# Patient Record
Sex: FEMALE | Race: BLACK OR AFRICAN AMERICAN | Employment: UNEMPLOYED | URBAN - METROPOLITAN AREA
[De-identification: names, ages, dates, MRNs, and addresses within clinical notes are randomized per-mention and may not be internally consistent; named-entity substitution may affect disease eponyms.]

---

## 2019-09-04 ENCOUNTER — HOSPITAL ENCOUNTER (EMERGENCY)
Age: 24
Discharge: HOME | End: 2019-09-04
Attending: EMERGENCY MEDICINE
Payer: COMMERCIAL

## 2019-09-04 VITALS
HEART RATE: 97 BPM | OXYGEN SATURATION: 100 % | TEMPERATURE: 98.6 F | HEIGHT: 59 IN | SYSTOLIC BLOOD PRESSURE: 126 MMHG | WEIGHT: 149.47 LBS | RESPIRATION RATE: 16 BRPM | BODY MASS INDEX: 30.13 KG/M2 | DIASTOLIC BLOOD PRESSURE: 97 MMHG

## 2019-09-04 DIAGNOSIS — M25.561 ACUTE PAIN OF RIGHT KNEE: Primary | ICD-10-CM

## 2019-09-04 DIAGNOSIS — N76.0 BACTERIAL VAGINOSIS: ICD-10-CM

## 2019-09-04 DIAGNOSIS — B96.89 BACTERIAL VAGINOSIS: ICD-10-CM

## 2019-09-04 LAB
BACTERIA WET PREP: ABNORMAL
CLUE CELLS: ABNORMAL
EPITHELIAL CELLS WET PREP: ABNORMAL
HCG(URINE) PREGNANCY TEST: NEGATIVE
RBC WET PREP: ABNORMAL
SOURCE WET PREP: ABNORMAL
TRICHOMONAS PREP: ABNORMAL
URINE TRICHOMONAS EVALUATION: NORMAL
WBC WET PREP: ABNORMAL
YEAST WET PREP: ABNORMAL

## 2019-09-04 PROCEDURE — 87591 N.GONORRHOEAE DNA AMP PROB: CPT

## 2019-09-04 PROCEDURE — 99283 EMERGENCY DEPT VISIT LOW MDM: CPT

## 2019-09-04 PROCEDURE — 81015 MICROSCOPIC EXAM OF URINE: CPT

## 2019-09-04 PROCEDURE — 87210 SMEAR WET MOUNT SALINE/INK: CPT

## 2019-09-04 PROCEDURE — 87491 CHLMYD TRACH DNA AMP PROBE: CPT

## 2019-09-04 PROCEDURE — 84703 CHORIONIC GONADOTROPIN ASSAY: CPT

## 2019-09-04 RX ORDER — METRONIDAZOLE 500 MG/1
500 TABLET ORAL 2 TIMES DAILY
Qty: 14 TABLET | Refills: 0 | Status: SHIPPED | OUTPATIENT
Start: 2019-09-04 | End: 2019-09-04

## 2019-09-04 RX ORDER — METRONIDAZOLE 7.5 MG/G
GEL VAGINAL
Qty: 1 TUBE | Refills: 0 | Status: SHIPPED | OUTPATIENT
Start: 2019-09-04 | End: 2019-09-11

## 2019-09-05 LAB
C TRACH DNA GENITAL QL NAA+PROBE: NEGATIVE
N. GONORRHOEAE DNA: NEGATIVE

## 2019-10-03 ENCOUNTER — HOSPITAL ENCOUNTER (EMERGENCY)
Age: 24
Discharge: HOME | End: 2019-10-03
Attending: EMERGENCY MEDICINE
Payer: COMMERCIAL

## 2019-10-03 VITALS
SYSTOLIC BLOOD PRESSURE: 130 MMHG | HEART RATE: 96 BPM | RESPIRATION RATE: 14 BRPM | HEIGHT: 60 IN | DIASTOLIC BLOOD PRESSURE: 87 MMHG | TEMPERATURE: 98.6 F | WEIGHT: 147.05 LBS | BODY MASS INDEX: 28.87 KG/M2 | OXYGEN SATURATION: 100 %

## 2019-10-03 DIAGNOSIS — G62.9 NEUROPATHY: Primary | ICD-10-CM

## 2019-10-03 DIAGNOSIS — R20.0 NUMBNESS: ICD-10-CM

## 2019-10-03 PROCEDURE — 99283 EMERGENCY DEPT VISIT LOW MDM: CPT

## 2019-10-03 RX ORDER — METHYLPREDNISOLONE 4 MG/1
2 TABLET ORAL DAILY
Qty: 3 TABLET | Refills: 0 | Status: SHIPPED | OUTPATIENT
Start: 2019-10-03 | End: 2019-10-09

## 2020-03-06 ENCOUNTER — HOSPITAL ENCOUNTER (EMERGENCY)
Age: 25
Discharge: HOME | End: 2020-03-06
Attending: EMERGENCY MEDICINE
Payer: COMMERCIAL

## 2020-03-06 VITALS
SYSTOLIC BLOOD PRESSURE: 136 MMHG | BODY MASS INDEX: 26.52 KG/M2 | HEART RATE: 90 BPM | WEIGHT: 149.69 LBS | RESPIRATION RATE: 16 BRPM | HEIGHT: 63 IN | DIASTOLIC BLOOD PRESSURE: 78 MMHG | OXYGEN SATURATION: 100 % | TEMPERATURE: 98.7 F

## 2020-03-06 PROCEDURE — 6370000000 HC RX 637 (ALT 250 FOR IP): Performed by: EMERGENCY MEDICINE

## 2020-03-06 PROCEDURE — 96372 THER/PROPH/DIAG INJ SC/IM: CPT

## 2020-03-06 PROCEDURE — 87070 CULTURE OTHR SPECIMN AEROBIC: CPT

## 2020-03-06 PROCEDURE — 99283 EMERGENCY DEPT VISIT LOW MDM: CPT

## 2020-03-06 PROCEDURE — 56420 I&D BARTHOLINS GLAND ABSCESS: CPT

## 2020-03-06 PROCEDURE — 87205 SMEAR GRAM STAIN: CPT

## 2020-03-06 PROCEDURE — 87591 N.GONORRHOEAE DNA AMP PROB: CPT

## 2020-03-06 PROCEDURE — 6360000002 HC RX W HCPCS: Performed by: EMERGENCY MEDICINE

## 2020-03-06 PROCEDURE — 87186 SC STD MICRODIL/AGAR DIL: CPT

## 2020-03-06 PROCEDURE — 87077 CULTURE AEROBIC IDENTIFY: CPT

## 2020-03-06 PROCEDURE — 87491 CHLMYD TRACH DNA AMP PROBE: CPT

## 2020-03-06 RX ORDER — CEFTRIAXONE SODIUM 250 MG/1
250 INJECTION, POWDER, FOR SOLUTION INTRAMUSCULAR; INTRAVENOUS ONCE
Status: COMPLETED | OUTPATIENT
Start: 2020-03-06 | End: 2020-03-06

## 2020-03-06 RX ORDER — SULFAMETHOXAZOLE AND TRIMETHOPRIM 800; 160 MG/1; MG/1
1 TABLET ORAL 2 TIMES DAILY
Qty: 20 TABLET | Refills: 0 | Status: SHIPPED | OUTPATIENT
Start: 2020-03-06 | End: 2020-03-16

## 2020-03-06 RX ORDER — AZITHROMYCIN 500 MG/1
1000 TABLET, FILM COATED ORAL ONCE
Status: COMPLETED | OUTPATIENT
Start: 2020-03-06 | End: 2020-03-06

## 2020-03-06 RX ORDER — CEPHALEXIN 500 MG/1
500 CAPSULE ORAL 4 TIMES DAILY
Qty: 28 CAPSULE | Refills: 0 | Status: SHIPPED | OUTPATIENT
Start: 2020-03-06 | End: 2020-03-13

## 2020-03-06 RX ADMIN — AZITHROMYCIN 1000 MG: 500 TABLET, FILM COATED ORAL at 22:13

## 2020-03-06 RX ADMIN — CEFTRIAXONE SODIUM 250 MG: 250 INJECTION, POWDER, FOR SOLUTION INTRAMUSCULAR; INTRAVENOUS at 22:13

## 2020-03-06 ASSESSMENT — PAIN SCALES - GENERAL
PAINLEVEL_OUTOF10: 0
PAINLEVEL_OUTOF10: 4

## 2020-03-06 ASSESSMENT — PAIN DESCRIPTION - LOCATION: LOCATION: VAGINA

## 2020-03-06 ASSESSMENT — PAIN DESCRIPTION - DESCRIPTORS: DESCRIPTORS: CONSTANT;PRESSURE

## 2020-03-06 ASSESSMENT — PAIN DESCRIPTION - PAIN TYPE: TYPE: ACUTE PAIN

## 2020-03-07 NOTE — ED PROVIDER NOTES
file     Physically abused: Not on file     Forced sexual activity: Not on file   Other Topics Concern    Not on file   Social History Narrative    Not on file     No current facility-administered medications for this encounter. Current Outpatient Medications   Medication Sig Dispense Refill    cephALEXin (KEFLEX) 500 MG capsule Take 1 capsule by mouth 4 times daily for 7 days 28 capsule 0    sulfamethoxazole-trimethoprim (BACTRIM DS) 800-160 MG per tablet Take 1 tablet by mouth 2 times daily for 10 days 20 tablet 0    insulin NPH (HUMULIN N;NOVOLIN N) 100 UNIT/ML injection vial Inject into the skin 2 times daily (before meals)       Allergies   Allergen Reactions    Shiitake Mushroom Anaphylaxis       REVIEW OF SYSTEMS  Positives and pertinent negatives as per HPI. Six other systems were reviewed and are negative. Nursing notes pertaining to ROS were reviewed. PHYSICAL EXAM   /78   Pulse 90   Temp 98.7 °F (37.1 °C) (Oral)   Resp 16   Ht 5' 3\" (1.6 m)   Wt 149 lb 11.1 oz (67.9 kg)   LMP  (LMP Unknown)   SpO2 100%   BMI 26.52 kg/m²   GENERAL APPEARANCE: Awake and alert. Cooperative. No acute distress. HEAD: Normocephalic. Atraumatic. EYES: PERRL. EOM's grossly intact. No scleral icterus, injection or exudate  ENT: Mucous membranes are moist.  NECK: Supple. Normal ROM. ABDOMEN: Nondistended, nontender  Pelvic: Right Bartholin's gland abscess is approximately 4 cm in size and tender. No other vaginal discharge or external genitalia lesions are noted. EXTREMITIES: MAEE. No acute deformities. SKIN: Warm and dry. NEUROLOGICAL: Alert and oriented.      Incision/Drainage  Date/Time: 3/6/2020 10:10 PM  Performed by: Lina King MD  Authorized by: Lina King MD     Consent:     Consent obtained:  Verbal    Consent given by:  Patient    Risks discussed:  Incomplete drainage and bleeding    Alternatives discussed:  Referral  Location:     Type:  Bartholin cyst Size:  4cm    Location:  Anogenital    Anogenital location:  Vulva  Pre-procedure details:     Skin preparation:  Betadine  Anesthesia (see MAR for exact dosages): Anesthesia method:  None  Procedure type:     Complexity:  Complex  Procedure details:     Needle aspiration: no      Incision types:  Stab incision    Incision depth:  Submucosal    Scalpel blade:  11    Wound management:  Probed and deloculated    Drainage:  Purulent    Drainage amount:  Copious    Wound treatment:  Drain placed    Packing materials: Word catheter  Post-procedure details:     Patient tolerance of procedure: Tolerated well, no immediate complications          ED COURSE/MDM  Bartholin's abscess: Status post incision and drainage with placement of Word catheter. Cultures were taken from the abscess and vaginal cultures were also taken. Patient was treated empirically for gonorrhea and chlamydia and will be given a 7-day course of Keflex and Bactrim to treat for E. coli and staph. Patient was advised on the care of the Word catheter and to have follow-up with her OB/GYN or PCP within 3 to 4 weeks to have the catheter removed. Hypertension:  Patient was hypertensive during the ER visit today. There are no signs of hypertensive emergency or evidence of end organ damage by history or physical exam.  These findings were discussed with the patient and advised to follow up with primary care physician to further assess and treat hypertension in the outpatient setting. The patient's blood pressure was found to be elevated according to CMS/Medicare and the Affordable Care Act/ObamaCare criteria. Elevated blood pressure could occur because of pain or anxiety or other reasons and does not mean that they need to have their blood pressure treated or medications otherwise adjusted. However, this could also be a sign that they will need to have their blood pressure treated or medications changed.   The patient was instructed to take a

## 2020-03-09 LAB
C TRACH DNA GENITAL QL NAA+PROBE: NEGATIVE
GRAM STAIN RESULT: ABNORMAL
N. GONORRHOEAE DNA: NEGATIVE
ORGANISM: ABNORMAL
ORGANISM: ABNORMAL
WOUND/ABSCESS: ABNORMAL
WOUND/ABSCESS: ABNORMAL

## 2020-03-10 ENCOUNTER — HOSPITAL ENCOUNTER (EMERGENCY)
Age: 25
Discharge: HOME | End: 2020-03-10
Payer: COMMERCIAL

## 2020-03-10 VITALS
OXYGEN SATURATION: 100 % | HEIGHT: 61 IN | TEMPERATURE: 97.5 F | HEART RATE: 97 BPM | BODY MASS INDEX: 27.35 KG/M2 | SYSTOLIC BLOOD PRESSURE: 133 MMHG | WEIGHT: 144.84 LBS | RESPIRATION RATE: 14 BRPM | DIASTOLIC BLOOD PRESSURE: 100 MMHG

## 2020-03-10 PROCEDURE — 99282 EMERGENCY DEPT VISIT SF MDM: CPT

## 2020-03-10 ASSESSMENT — PAIN DESCRIPTION - PAIN TYPE: TYPE: ACUTE PAIN

## 2020-03-10 ASSESSMENT — PAIN DESCRIPTION - DESCRIPTORS: DESCRIPTORS: ACHING;TENDER

## 2020-03-10 ASSESSMENT — PAIN DESCRIPTION - LOCATION: LOCATION: VAGINA

## 2020-03-10 ASSESSMENT — PAIN SCALES - GENERAL: PAINLEVEL_OUTOF10: 4

## 2020-03-10 ASSESSMENT — PAIN DESCRIPTION - ONSET: ONSET: PROGRESSIVE

## 2020-03-10 NOTE — ED NOTES
Word catheter removed. Pt tolerated well. Advised to continue ATB therapy.       Heladio Fonseca RN  03/10/20 1945

## 2020-03-10 NOTE — ED PROVIDER NOTES
**EVALUATED BY ADVANCED PRACTICE PROVIDER**        [id number] [facility]  EMERGENCY DEPARTMENT ENCOUNTER      Pt Name: Celia Hampton  FYP:[id number]  Pamgfalvina 1995  Date of evaluation: 3/10/2020  Provider: Cyndie Mcpherson PA-C      Chief Complaint:    Chief Complaint   Patient presents with    Other     pt states was seen in ER had bartholin cyst had sheikh catheter put in wants it out  starting to irritating pt       Nursing Notes, Past Medical Hx, Past Surgical Hx, Social Hx, Allergies, and Family Hx were all reviewed and agreed with or any disagreements were addressed in the HPI.    HPI:  (Location, Duration, Timing, Severity, Quality, Assoc Sx, Context, Modifying factors)  This is a  25 y.o. female who presents here to the emergency department, the patient states that she was seen in the emergency department, had a Bartholin cyst incised and drained, had a Word catheter placed she states that it is irritating her vagina and she would like this catheter removed. She states that her pain level is 4/10, she denies fevers or chills, she denies any difficulty with urination. PastMedical/Surgical History:      Diagnosis Date    Diabetes mellitus ([facility] [address]      No past surgical history on file. Medications:  Current Discharge Medication List      CONTINUE these medications which have NOT CHANGED    Details   cephALEXin (KEFLEX) 500 MG capsule Take 1 capsule by mouth 4 times daily for 7 days  Qty: 28 capsule, Refills: 0      insulin NPH (HUMULIN N;NOVOLIN N) 100 UNIT/ML injection vial Inject into the skin 2 times daily (before meals)      sulfamethoxazole-trimethoprim (BACTRIM DS) 800-160 MG per tablet Take 1 tablet by mouth 2 times daily for 10 days  Qty: 20 tablet, Refills: 0               Review of Systems:  Review of Systems  Positives and Pertinent negatives as per HPI. Except as noted above in the ROS, problem specific ROS was completed and is negative.     Physical cellulitis    The patient tolerated their visit well. I evaluated the patient. The physician was available for consultation as needed. The patient and / or the family were informed of the results of any tests, a time was given to answer questions, a plan was proposed and they agreed with plan. CLINICAL IMPRESSION:  1.  Bartholin cyst        DISPOSITION Decision To Discharge 03/10/2020 07:32:35 PM      PATIENT REFERRED TO:  Kay Gan    Call in 1 day  For a recheck in 3-7 days      DISCHARGE MEDICATIONS:  Current Discharge Medication List          DISCONTINUED MEDICATIONS:  Current Discharge Medication List                 (Please note the MDM and HPI sections of this note were completed with a voice recognition program.  Efforts were made to edit the dictations but occasionally words are mis-transcribed.)    Electronically signed, Alcides Good PA-C,          Alcides Good PA-C  03/17/20 6604

## 2020-09-30 ENCOUNTER — APPOINTMENT (OUTPATIENT)
Dept: CT IMAGING | Age: 25
End: 2020-09-30
Payer: COMMERCIAL

## 2020-09-30 ENCOUNTER — HOSPITAL ENCOUNTER (EMERGENCY)
Age: 25
Discharge: HOME | End: 2020-09-30
Attending: EMERGENCY MEDICINE
Payer: COMMERCIAL

## 2020-09-30 ENCOUNTER — APPOINTMENT (OUTPATIENT)
Dept: GENERAL RADIOLOGY | Age: 25
End: 2020-09-30
Payer: COMMERCIAL

## 2020-09-30 VITALS
SYSTOLIC BLOOD PRESSURE: 132 MMHG | TEMPERATURE: 98.9 F | RESPIRATION RATE: 16 BRPM | OXYGEN SATURATION: 99 % | HEART RATE: 103 BPM | DIASTOLIC BLOOD PRESSURE: 91 MMHG

## 2020-09-30 LAB
A/G RATIO: 1.4 (ref 1.1–2.2)
ALBUMIN SERPL-MCNC: 5 G/DL (ref 3.4–5)
ALP BLD-CCNC: 81 U/L (ref 40–129)
ALT SERPL-CCNC: 19 U/L (ref 10–40)
AMPHETAMINE SCREEN, URINE: NORMAL
ANION GAP SERPL CALCULATED.3IONS-SCNC: 19 MMOL/L (ref 3–16)
AST SERPL-CCNC: 20 U/L (ref 15–37)
BACTERIA: ABNORMAL /HPF
BARBITURATE SCREEN URINE: NORMAL
BASE EXCESS VENOUS: -5.1 MMOL/L (ref -3–3)
BASOPHILS ABSOLUTE: 0.1 K/UL (ref 0–0.2)
BASOPHILS RELATIVE PERCENT: 0.9 %
BENZODIAZEPINE SCREEN, URINE: NORMAL
BILIRUB SERPL-MCNC: 0.8 MG/DL (ref 0–1)
BILIRUBIN URINE: NEGATIVE
BLOOD, URINE: ABNORMAL
BUN BLDV-MCNC: 10 MG/DL (ref 7–20)
CALCIUM SERPL-MCNC: 10.2 MG/DL (ref 8.3–10.6)
CANNABINOID SCREEN URINE: NORMAL
CHLORIDE BLD-SCNC: 94 MMOL/L (ref 99–110)
CHP ED QC CHECK: NORMAL
CLARITY: CLEAR
CO2: 20 MMOL/L (ref 21–32)
COCAINE METABOLITE SCREEN URINE: NORMAL
COLOR: YELLOW
CREAT SERPL-MCNC: 0.8 MG/DL (ref 0.6–1.1)
EKG ATRIAL RATE: 101 BPM
EKG DIAGNOSIS: NORMAL
EKG P AXIS: 52 DEGREES
EKG P-R INTERVAL: 152 MS
EKG Q-T INTERVAL: 348 MS
EKG QRS DURATION: 78 MS
EKG QTC CALCULATION (BAZETT): 451 MS
EKG R AXIS: 32 DEGREES
EKG T AXIS: 22 DEGREES
EKG VENTRICULAR RATE: 101 BPM
EOSINOPHILS ABSOLUTE: 0 K/UL (ref 0–0.6)
EOSINOPHILS RELATIVE PERCENT: 0.2 %
EPITHELIAL CELLS, UA: ABNORMAL /HPF (ref 0–5)
GFR AFRICAN AMERICAN: >60
GFR NON-AFRICAN AMERICAN: >60
GLOBULIN: 3.5 G/DL
GLUCOSE BLD-MCNC: 265 MG/DL (ref 70–99)
GLUCOSE BLD-MCNC: 530 MG/DL (ref 70–99)
GLUCOSE URINE: >=1000 MG/DL
HCG(URINE) PREGNANCY TEST: NEGATIVE
HCO3 VENOUS: 21.7 MMOL/L (ref 23–29)
HCT VFR BLD CALC: 43.3 % (ref 36–48)
HEMOGLOBIN: 14.3 G/DL (ref 12–16)
KETONES, URINE: 15 MG/DL
LEUKOCYTE ESTERASE, URINE: NEGATIVE
LYMPHOCYTES ABSOLUTE: 2.1 K/UL (ref 1–5.1)
LYMPHOCYTES RELATIVE PERCENT: 16.3 %
Lab: NORMAL
MCH RBC QN AUTO: 28.6 PG (ref 26–34)
MCHC RBC AUTO-ENTMCNC: 33 G/DL (ref 31–36)
MCV RBC AUTO: 86.7 FL (ref 80–100)
METHADONE SCREEN, URINE: NORMAL
MICROSCOPIC EXAMINATION: YES
MONOCYTES ABSOLUTE: 0.5 K/UL (ref 0–1.3)
MONOCYTES RELATIVE PERCENT: 4 %
NEUTROPHILS ABSOLUTE: 10.2 K/UL (ref 1.7–7.7)
NEUTROPHILS RELATIVE PERCENT: 78.6 %
NITRITE, URINE: NEGATIVE
O2 SAT, VEN: 74 %
O2 THERAPY: ABNORMAL
OPIATE SCREEN URINE: NORMAL
OXYCODONE URINE: NORMAL
PCO2, VEN: 46.8 MMHG (ref 40–50)
PDW BLD-RTO: 13.4 % (ref 12.4–15.4)
PERFORMED ON: ABNORMAL
PH UA: 6
PH UA: 6 (ref 5–8)
PH VENOUS: 7.27 (ref 7.35–7.45)
PHENCYCLIDINE SCREEN URINE: NORMAL
PLATELET # BLD: 346 K/UL (ref 135–450)
PMV BLD AUTO: 9.4 FL (ref 5–10.5)
PO2, VEN: 44.5 MMHG (ref 25–40)
POTASSIUM REFLEX MAGNESIUM: 4.2 MMOL/L (ref 3.5–5.1)
PROPOXYPHENE SCREEN: NORMAL
PROTEIN UA: 30 MG/DL
RBC # BLD: 4.99 M/UL (ref 4–5.2)
RBC UA: ABNORMAL /HPF (ref 0–4)
SODIUM BLD-SCNC: 133 MMOL/L (ref 136–145)
SPECIFIC GRAVITY UA: 1.01 (ref 1–1.03)
TCO2 CALC VENOUS: 23 MMOL/L
TOTAL PROTEIN: 8.5 G/DL (ref 6.4–8.2)
URINE REFLEX TO CULTURE: ABNORMAL
URINE TYPE: ABNORMAL
UROBILINOGEN, URINE: 0.2 E.U./DL
WBC # BLD: 13 K/UL (ref 4–11)
WBC UA: ABNORMAL /HPF (ref 0–5)

## 2020-09-30 PROCEDURE — 6370000000 HC RX 637 (ALT 250 FOR IP): Performed by: EMERGENCY MEDICINE

## 2020-09-30 PROCEDURE — 93010 ELECTROCARDIOGRAM REPORT: CPT | Performed by: INTERNAL MEDICINE

## 2020-09-30 PROCEDURE — 71046 X-RAY EXAM CHEST 2 VIEWS: CPT

## 2020-09-30 PROCEDURE — 80307 DRUG TEST PRSMV CHEM ANLYZR: CPT

## 2020-09-30 PROCEDURE — 99284 EMERGENCY DEPT VISIT MOD MDM: CPT

## 2020-09-30 PROCEDURE — 84703 CHORIONIC GONADOTROPIN ASSAY: CPT

## 2020-09-30 PROCEDURE — 85025 COMPLETE CBC W/AUTO DIFF WBC: CPT

## 2020-09-30 PROCEDURE — 70450 CT HEAD/BRAIN W/O DYE: CPT

## 2020-09-30 PROCEDURE — 93005 ELECTROCARDIOGRAM TRACING: CPT | Performed by: EMERGENCY MEDICINE

## 2020-09-30 PROCEDURE — 2580000003 HC RX 258: Performed by: EMERGENCY MEDICINE

## 2020-09-30 PROCEDURE — 72040 X-RAY EXAM NECK SPINE 2-3 VW: CPT

## 2020-09-30 PROCEDURE — 80053 COMPREHEN METABOLIC PANEL: CPT

## 2020-09-30 PROCEDURE — 82803 BLOOD GASES ANY COMBINATION: CPT

## 2020-09-30 PROCEDURE — 81001 URINALYSIS AUTO W/SCOPE: CPT

## 2020-09-30 PROCEDURE — 96372 THER/PROPH/DIAG INJ SC/IM: CPT

## 2020-09-30 PROCEDURE — 36415 COLL VENOUS BLD VENIPUNCTURE: CPT

## 2020-09-30 RX ORDER — CYCLOBENZAPRINE HCL 5 MG
5 TABLET ORAL 3 TIMES DAILY PRN
Qty: 30 TABLET | Refills: 0 | Status: SHIPPED | OUTPATIENT
Start: 2020-09-30 | End: 2020-10-10

## 2020-09-30 RX ORDER — 0.9 % SODIUM CHLORIDE 0.9 %
1000 INTRAVENOUS SOLUTION INTRAVENOUS ONCE
Status: COMPLETED | OUTPATIENT
Start: 2020-09-30 | End: 2020-09-30

## 2020-09-30 RX ORDER — IBUPROFEN 400 MG/1
400 TABLET ORAL EVERY 6 HOURS PRN
Qty: 30 TABLET | Refills: 0 | Status: SHIPPED | OUTPATIENT
Start: 2020-09-30

## 2020-09-30 RX ADMIN — SODIUM CHLORIDE 1000 ML: 9 INJECTION, SOLUTION INTRAVENOUS at 11:59

## 2020-09-30 RX ADMIN — INSULIN HUMAN 10 UNITS: 100 INJECTION, SOLUTION PARENTERAL at 11:59

## 2020-09-30 ASSESSMENT — PAIN DESCRIPTION - DESCRIPTORS: DESCRIPTORS: ACHING

## 2020-09-30 ASSESSMENT — PAIN DESCRIPTION - PAIN TYPE: TYPE: ACUTE PAIN

## 2020-09-30 ASSESSMENT — PAIN SCALES - GENERAL
PAINLEVEL_OUTOF10: 4
PAINLEVEL_OUTOF10: 10

## 2020-09-30 ASSESSMENT — PAIN DESCRIPTION - PROGRESSION: CLINICAL_PROGRESSION: NOT CHANGED

## 2020-09-30 ASSESSMENT — PAIN DESCRIPTION - LOCATION: LOCATION: HEAD

## 2020-09-30 ASSESSMENT — PAIN DESCRIPTION - FREQUENCY: FREQUENCY: CONTINUOUS

## 2020-09-30 ASSESSMENT — PAIN DESCRIPTION - ONSET: ONSET: GRADUAL

## 2020-09-30 NOTE — ED NOTES
Pt d/c home with AVS and script x 1 no s.s of distress noted, pt states she is feeling a lot better.  Pt sister is Walker Paniagua is picking her up      Nico Thornton, REHANA  09/30/20 5172
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
## 2020-09-30 NOTE — ED NOTES
Pt unable to wake her sister, RN at bedside pt did not wake to name or touch, pt did open her eyes to painful stimuli, she said she is just tired because she has not slept since yesterday , pt tearful and reports headache, pt diaphoretic, /116 heart rate 119, Lesli RT at bedside to transport pt to scans pt was able to stand, to get in wheel chair      Scott Barnett RN  09/30/20 8507

## 2021-04-13 ENCOUNTER — HOSPITAL ENCOUNTER (EMERGENCY)
Age: 26
Discharge: HOME | End: 2021-04-13
Payer: COMMERCIAL

## 2021-04-13 VITALS
DIASTOLIC BLOOD PRESSURE: 92 MMHG | HEART RATE: 99 BPM | RESPIRATION RATE: 18 BRPM | OXYGEN SATURATION: 97 % | HEIGHT: 63 IN | TEMPERATURE: 98.1 F | SYSTOLIC BLOOD PRESSURE: 126 MMHG | WEIGHT: 145.28 LBS | BODY MASS INDEX: 25.74 KG/M2

## 2021-04-13 DIAGNOSIS — Z20.2 EXPOSURE TO SEXUALLY TRANSMITTED DISEASE (STD): Primary | ICD-10-CM

## 2021-04-13 LAB
BACTERIA WET PREP: NORMAL
BILIRUBIN URINE: NEGATIVE
BLOOD, URINE: NEGATIVE
CLARITY: CLEAR
CLUE CELLS: NORMAL
COLOR: ABNORMAL
EPITHELIAL CELLS WET PREP: NORMAL
GLUCOSE URINE: >=1000 MG/DL
HCG(URINE) PREGNANCY TEST: NEGATIVE
KETONES, URINE: 15 MG/DL
LEUKOCYTE ESTERASE, URINE: NEGATIVE
MICROSCOPIC EXAMINATION: ABNORMAL
NITRITE, URINE: NEGATIVE
PH UA: 6 (ref 5–8)
PROTEIN UA: NEGATIVE MG/DL
RBC WET PREP: NORMAL
SOURCE WET PREP: NORMAL
SPECIFIC GRAVITY UA: 1.01 (ref 1–1.03)
TRICHOMONAS PREP: NORMAL
URINE REFLEX TO CULTURE: ABNORMAL
URINE TYPE: ABNORMAL
UROBILINOGEN, URINE: 0.2 E.U./DL
WBC WET PREP: NORMAL
YEAST WET PREP: NORMAL

## 2021-04-13 PROCEDURE — 6360000002 HC RX W HCPCS: Performed by: PHYSICIAN ASSISTANT

## 2021-04-13 PROCEDURE — 87591 N.GONORRHOEAE DNA AMP PROB: CPT

## 2021-04-13 PROCEDURE — 81003 URINALYSIS AUTO W/O SCOPE: CPT

## 2021-04-13 PROCEDURE — 84703 CHORIONIC GONADOTROPIN ASSAY: CPT

## 2021-04-13 PROCEDURE — 87210 SMEAR WET MOUNT SALINE/INK: CPT

## 2021-04-13 PROCEDURE — 96372 THER/PROPH/DIAG INJ SC/IM: CPT

## 2021-04-13 PROCEDURE — 99284 EMERGENCY DEPT VISIT MOD MDM: CPT

## 2021-04-13 PROCEDURE — 87491 CHLMYD TRACH DNA AMP PROBE: CPT

## 2021-04-13 RX ORDER — CEFTRIAXONE 500 MG/1
500 INJECTION, POWDER, FOR SOLUTION INTRAMUSCULAR; INTRAVENOUS ONCE
Status: COMPLETED | OUTPATIENT
Start: 2021-04-13 | End: 2021-04-13

## 2021-04-13 RX ORDER — DOXYCYCLINE HYCLATE 100 MG
100 TABLET ORAL 2 TIMES DAILY
Qty: 14 TABLET | Refills: 0 | Status: SHIPPED | OUTPATIENT
Start: 2021-04-13 | End: 2021-04-20

## 2021-04-13 RX ADMIN — CEFTRIAXONE SODIUM 500 MG: 500 INJECTION, POWDER, FOR SOLUTION INTRAMUSCULAR; INTRAVENOUS at 17:58

## 2021-04-13 ASSESSMENT — ENCOUNTER SYMPTOMS
NAUSEA: 0
ABDOMINAL PAIN: 0
VOMITING: 0

## 2021-04-13 NOTE — ED PROVIDER NOTES
and for  further testing for syphilis HIV hepatitis if desired. Return for worsening. Agreed understood. PROCEDURES:  None    FINAL IMPRESSION      1.  Exposure to sexually transmitted disease (STD)        DISPOSITION/PLAN   DISPOSITION Decision To Discharge 04/13/2021 06:26:37 PM      PATIENT REFERRED TO:  [id number]  [address]  [phone]    Go in 1 week  for reevaluation and for further testing for syphilis, HIV, hepatitis if desired    [place] [id number] [phone]    As needed, If symptoms worsen      DISCHARGE MEDICATIONS:  New Prescriptions    DOXYCYCLINE HYCLATE (VIBRA-TABS) 100 MG TABLET    Take 1 tablet by mouth 2 times daily for 7 days       (Please note that portions ofthis note were completed with a voice recognition program.  Efforts were made to edit the dictations but occasionally words are mis-transcribed.)    Jillyn Brunner, [address] [place] [address]  04/13/21 Lavonne Aguilera

## 2021-04-13 NOTE — ED NOTES
Acknowledged pt by pt's name. Verified pt by name and date of birth. Checked arm band, allergies, reviewed past medical history. Introduced myself to patient  Duration of ED plan of care explained to patient  Explained planned tests and procedures  Thanked patient for coming to [facility].    Asked if there was anything else I could do for the patient before exiting room. CB in reach.      Joanna Wilcox RN  04/13/21 0236

## 2021-04-14 LAB
C TRACH DNA GENITAL QL NAA+PROBE: NEGATIVE
N. GONORRHOEAE DNA: NEGATIVE

## 2023-03-30 ENCOUNTER — HOSPITAL ENCOUNTER (EMERGENCY)
Age: 28
Discharge: LEFT BEFORE DISCHARGE | End: 2023-03-30

## 2023-03-30 VITALS
OXYGEN SATURATION: 100 % | DIASTOLIC BLOOD PRESSURE: 98 MMHG | BODY MASS INDEX: 23.78 KG/M2 | RESPIRATION RATE: 18 BRPM | SYSTOLIC BLOOD PRESSURE: 142 MMHG | TEMPERATURE: 98.3 F | WEIGHT: 134.26 LBS | HEART RATE: 92 BPM

## 2023-03-30 LAB
CHP ED QC CHECK: YES
GLUCOSE BLD-MCNC: 163 MG/DL
GLUCOSE BLD-MCNC: 163 MG/DL (ref 70–99)
PERFORMED ON: ABNORMAL

## 2023-03-30 NOTE — ED NOTES
Put call light on and asking \"how much longer will it be, I'm hungry, I want to leave. \"   Explained that she is waiting for EMD to see her-but EMD should be in very soon. Glucose by CGM is 99 right now.      Ben Mckoy RN  03/30/23 0640

## 2023-03-30 NOTE — ED NOTES
Left without being seen after being triaged by RN. Observed to walk out of ED. Pt states she doesn't want to wait anymore. Encouraged to follow up with PCP.      Dionne Elam RN  03/30/23 9746

## 2024-03-15 ENCOUNTER — HOSPITAL ENCOUNTER (EMERGENCY)
Age: 29
Discharge: HOME | End: 2024-03-15

## 2024-03-15 VITALS
BODY MASS INDEX: 25.38 KG/M2 | HEART RATE: 88 BPM | OXYGEN SATURATION: 100 % | WEIGHT: 143.3 LBS | SYSTOLIC BLOOD PRESSURE: 148 MMHG | RESPIRATION RATE: 16 BRPM | TEMPERATURE: 97.6 F | DIASTOLIC BLOOD PRESSURE: 118 MMHG

## 2024-03-15 DIAGNOSIS — R03.0 ELEVATED BLOOD PRESSURE READING: ICD-10-CM

## 2024-03-15 DIAGNOSIS — Z20.2 STD EXPOSURE: Primary | ICD-10-CM

## 2024-03-15 DIAGNOSIS — F17.200 CURRENT SMOKER: ICD-10-CM

## 2024-03-15 DIAGNOSIS — N76.0 BV (BACTERIAL VAGINOSIS): ICD-10-CM

## 2024-03-15 DIAGNOSIS — B96.89 BV (BACTERIAL VAGINOSIS): ICD-10-CM

## 2024-03-15 LAB
BACTERIA GENITAL QL WET PREP: ABNORMAL
BILIRUB UR QL STRIP.AUTO: NEGATIVE
CLARITY UR: CLEAR
CLUE CELLS SPEC QL WET PREP: ABNORMAL
COLOR UR: YELLOW
EPI CELLS SPEC QL WET PREP: ABNORMAL
GLUCOSE UR STRIP.AUTO-MCNC: NEGATIVE MG/DL
HCG UR QL: NEGATIVE
HGB UR QL STRIP.AUTO: NEGATIVE
KETONES UR STRIP.AUTO-MCNC: NEGATIVE MG/DL
LEUKOCYTE ESTERASE UR QL STRIP.AUTO: NEGATIVE
NITRITE UR QL STRIP.AUTO: NEGATIVE
PH UR STRIP.AUTO: 8 [PH] (ref 5–8)
PROT UR STRIP.AUTO-MCNC: NEGATIVE MG/DL
RBC SPEC QL WET PREP: ABNORMAL
SP GR UR STRIP.AUTO: 1.01 (ref 1–1.03)
SPECIMEN SOURCE FLD: ABNORMAL
T VAGINALIS GENITAL QL WET PREP: ABNORMAL
UA COMPLETE W REFLEX CULTURE PNL UR: NORMAL
UA DIPSTICK W REFLEX MICRO PNL UR: NORMAL
URN SPEC COLLECT METH UR: NORMAL
UROBILINOGEN UR STRIP-ACNC: 0.2 E.U./DL
WBC SPEC QL WET PREP: ABNORMAL
YEAST GENITAL QL WET PREP: ABNORMAL

## 2024-03-15 PROCEDURE — 99284 EMERGENCY DEPT VISIT MOD MDM: CPT

## 2024-03-15 PROCEDURE — 87591 N.GONORRHOEAE DNA AMP PROB: CPT

## 2024-03-15 PROCEDURE — 87491 CHLMYD TRACH DNA AMP PROBE: CPT

## 2024-03-15 PROCEDURE — 6360000002 HC RX W HCPCS

## 2024-03-15 PROCEDURE — 87210 SMEAR WET MOUNT SALINE/INK: CPT

## 2024-03-15 PROCEDURE — 81003 URINALYSIS AUTO W/O SCOPE: CPT

## 2024-03-15 PROCEDURE — 96372 THER/PROPH/DIAG INJ SC/IM: CPT

## 2024-03-15 PROCEDURE — 84703 CHORIONIC GONADOTROPIN ASSAY: CPT

## 2024-03-15 RX ORDER — LEVONORGESTREL 1.5 MG/1
1.5 TABLET ORAL ONCE
Qty: 1 TABLET | Refills: 0 | Status: SHIPPED | OUTPATIENT
Start: 2024-03-15 | End: 2024-03-15

## 2024-03-15 RX ORDER — DOXYCYCLINE HYCLATE 100 MG
100 TABLET ORAL 2 TIMES DAILY
Qty: 14 TABLET | Refills: 0 | Status: SHIPPED | OUTPATIENT
Start: 2024-03-15 | End: 2024-03-22

## 2024-03-15 RX ORDER — METRONIDAZOLE 500 MG/1
500 TABLET ORAL 2 TIMES DAILY
Qty: 14 TABLET | Refills: 0 | Status: SHIPPED | OUTPATIENT
Start: 2024-03-15 | End: 2024-03-22

## 2024-03-15 RX ORDER — CEFTRIAXONE 500 MG/1
500 INJECTION, POWDER, FOR SOLUTION INTRAMUSCULAR; INTRAVENOUS ONCE
Status: COMPLETED | OUTPATIENT
Start: 2024-03-15 | End: 2024-03-15

## 2024-03-15 RX ADMIN — CEFTRIAXONE SODIUM 500 MG: 500 INJECTION, POWDER, FOR SOLUTION INTRAMUSCULAR; INTRAVENOUS at 20:56

## 2024-03-15 ASSESSMENT — PAIN - FUNCTIONAL ASSESSMENT: PAIN_FUNCTIONAL_ASSESSMENT: NONE - DENIES PAIN

## 2024-03-16 NOTE — ED PROVIDER NOTES
[facility]  EMERGENCY DEPARTMENT ENCOUNTER        Pt Name: Reny Reynolds  MRN: [id number]  Birthdate 1995  Date of evaluation: 3/15/2024  Provider: ELA Pedroza CNP  PCP: No primary care provider on file.  Note Started: 8:32 PM EDT 3/15/24      KEDAR. I have evaluated this patient.        CHIEF COMPLAINT       Chief Complaint   Patient presents with    Exposure to STD    Pregnancy Test       HISTORY OF PRESENT ILLNESS: 1 or more Elements     History From: Patient        Social Determinants Significantly Affecting Health : Patient has significant healthcare illiteracy    Chief Complaint: Above    Reny Reynolds is a 28 y.o. female who  has a past medical history of Diabetes mellitus (HCC). presents due to concern for potential STD exposure.  Male and female partner today and condom broke.  Unsure about pregnancy.  Would like to be tested and treated.  Denies any other concerns.  Reports occasional smoking and alcohol use    Nursing Notes were all reviewed and agreed with or any disagreements were addressed in the HPI.    REVIEW OF SYSTEMS :      Review of Systems    Positives and Pertinent negatives as per HPI.     SURGICAL HISTORY   History reviewed. No pertinent surgical history.    CURRENTMEDICATIONS       Previous Medications    DULAGLUTIDE (TRULICITY SC)    Inject 1.3 mg into the skin once a week saturdays       ALLERGIES     Shiitake mushroom    FAMILYHISTORY     History reviewed. No pertinent family history.     SOCIAL HISTORY       Social History     Tobacco Use    Smoking status: Never    Smokeless tobacco: Never   Substance Use Topics    Alcohol use: Not Currently    Drug use: Not Currently       SCREENINGS          Emre Coma Scale  Eye Opening: Spontaneous  Best Verbal Response: Oriented  Best Motor Response: Obeys commands  [place] Coma Scale Score: 15              CIWA Assessment  BP: (!) 148/118  Pulse: 88             PHYSICAL EXAM  1 or  proposed and they agreed with plan.    I am the Primary Clinician of Record.  FINAL IMPRESSION      1. STD exposure    2. BV (bacterial vaginosis)    3. Current smoker    4. Elevated blood pressure reading          DISPOSITION/PLAN     DISPOSITION Decision To Discharge 03/15/2024 08:49:25 PM      PATIENT REFERRED TO:  [facility]  [phone]  Call in 2 days  Establish appointment with a PCP      DISCHARGE MEDICATIONS:  New Prescriptions    DOXYCYCLINE HYCLATE (VIBRA-TABS) 100 MG TABLET    Take 1 tablet by mouth 2 times daily for 7 days    LEVONORGESTREL (PLAN B ONE-STEP) 1.5 MG TABLET    Take 1 tablet by mouth once for 1 dose    METRONIDAZOLE (FLAGYL) 500 MG TABLET    Take 1 tablet by mouth 2 times daily for 7 days       DISCONTINUED MEDICATIONS:  Discontinued Medications    No medications on file              (Please note that portions of this note were completed with a voice recognition program.  Efforts were made to edit the dictations but occasionally words are mis-transcribed.)    ELA Pedroza CNP (electronically signed)       Ken Culp APRN - CNP  03/15/24 2058

## 2024-03-16 NOTE — DISCHARGE INSTRUCTIONS
You were tested today for STIs. You were treated today for Gonorrhea, BV and chlamydia.   - Your Chlamydia and Gonorrhea test results should be back within 1-2 days and you will be contacted at the confidential number you gave us today.   - If any of the results come back positive you should contact all of your current and recent sexual partners so that they can get tested and treated.  - You should abstain from sex for at least 2 weeks or until all symptoms have resolved so as not to infect others.   - It is very important that you follow up with your regular doctor or an STI clinic for further follow up, reevaluation, and testing (HIV, Hepatitis, syphilis, etc) in the future.   You may also follow up with the [facility] for STI testing, call [phone] for an appointment.    Use condoms with all sexual partners!

## 2024-03-16 NOTE — ED TRIAGE NOTES
Pt to ER w concern with possible pregnancy. LMP 1.18.2024.  Pt also concerned with STD exposure after a condom broke.    GCS 15  VSS    Pt to BR to void for urine labs and swabs

## 2024-03-19 LAB
C TRACH DNA CVX QL NAA+PROBE: NEGATIVE
N GONORRHOEA DNA CERV MUCUS QL NAA+PROBE: NEGATIVE

## 2024-04-06 ENCOUNTER — APPOINTMENT (OUTPATIENT)
Dept: ULTRASOUND IMAGING | Age: 29
End: 2024-04-06

## 2024-04-06 ENCOUNTER — HOSPITAL ENCOUNTER (EMERGENCY)
Age: 29
Discharge: HOME | End: 2024-04-07
Attending: EMERGENCY MEDICINE

## 2024-04-06 VITALS
TEMPERATURE: 98.7 F | HEIGHT: 60 IN | RESPIRATION RATE: 18 BRPM | WEIGHT: 139.99 LBS | BODY MASS INDEX: 27.48 KG/M2 | SYSTOLIC BLOOD PRESSURE: 139 MMHG | HEART RATE: 76 BPM | DIASTOLIC BLOOD PRESSURE: 90 MMHG | OXYGEN SATURATION: 100 %

## 2024-04-06 DIAGNOSIS — O46.90 VAGINAL BLEEDING DURING PREGNANCY: Primary | ICD-10-CM

## 2024-04-06 DIAGNOSIS — O36.80X0 PREGNANCY, LOCATION UNKNOWN: ICD-10-CM

## 2024-04-06 LAB — B-HCG SERPL EIA 3RD IS-ACNC: 607.5 MIU/ML

## 2024-04-06 PROCEDURE — 36415 COLL VENOUS BLD VENIPUNCTURE: CPT

## 2024-04-06 PROCEDURE — 99284 EMERGENCY DEPT VISIT MOD MDM: CPT

## 2024-04-06 PROCEDURE — 84702 CHORIONIC GONADOTROPIN TEST: CPT

## 2024-04-06 PROCEDURE — 76801 OB US < 14 WKS SINGLE FETUS: CPT

## 2024-04-06 ASSESSMENT — PAIN - FUNCTIONAL ASSESSMENT: PAIN_FUNCTIONAL_ASSESSMENT: NONE - DENIES PAIN

## 2024-04-07 NOTE — ED PROVIDER NOTES
I PERSONALLY SAW THE PATIENT AND PERFORMED A SUBSTANTIVE PORTION OF THE VISIT INCLUDING ALL ASPECTS OF THE MEDICAL DECISION MAKING PROCESS.    [facility] EMERGENCY DEPARTMENT  EMERGENCY DEPARTMENT ENCOUNTER      Pt Name: Reny Reynolds  MRN: [id number]  Birthdate 1995  Date of evaluation: 4/6/2024  Provider: Phu Yañez MD    CHIEF COMPLAINT       Chief Complaint   Patient presents with    Spotting     Pt presents to the ED d/t spotting . Pt took a pregnancy test yesterday that was positive, then today started spotting. Last known period March 28; menstruated 1 day.        HISTORY OF PRESENT ILLNESS    Reny Reynolds is a 28 y.o. female who presents to the emergency department with vaginal spotting.  Patient presents with vaginal spotting.  Last known pregnancy March 28.  Positive pregnancy.  Sent for ultrasound to rule out ectopic.  No pain.  No other associated symptoms.  No rash.  No leg swelling.  No sick contacts.  No URI-like symptoms.  No UTI-like symptoms.  No known medication allergies.  No URI-like symptoms.  No sick contacts.    Nursing Notes were reviewed. Including nursing noted for FM, Surgical History, Past Medical History, Social History, vitals, and allergies; agree with all.     REVIEW OF SYSTEMS       Review of Systems    Except as noted above the remainder of the review of systems was reviewed and negative.     PAST MEDICAL HISTORY     Past Medical History:   Diagnosis Date    Diabetes mellitus (HCC)        SURGICAL HISTORY     History reviewed. No pertinent surgical history.    CURRENT MEDICATIONS       Previous Medications    No medications on file       ALLERGIES     Shiitake mushroom    FAMILY HISTORY      History reviewed. No pertinent family history.    SOCIAL HISTORY       Social History     Socioeconomic History    Marital status:      Spouse name: None    Number of children: None    Years of education: None    Highest education level: None

## 2024-04-07 NOTE — ED PROVIDER NOTES
EMERGENCY DEPARTMENT ENCOUNTER     [facility] EMERGENCY DEPARTMENT     Pt Name: Reny Reynolds   MRN: [id number]   Birthdate 1995   Date of evaluation: 4/6/2024   Provider: Manuel Palomo MD   PCP: No primary care provider on file.   Note Started: 7:20 AM EDT 4/7/24     CHIEF COMPLAINT     Chief Complaint   Patient presents with    Spotting     Pt presents to the ED d/t spotting . Pt took a pregnancy test yesterday that was positive, then today started spotting. Last known period March 28; menstruated 1 day.         HISTORY OF PRESENT ILLNESS:  History from : Patient   Limitations to history : None     Reny Reynolds is a 28 y.o. female who presents for vaginal bleeding.  Patient reports that she is having some spotting type vaginal bleeding today.  She had her last period in March on the 28th but it was only 1 day in duration.  Her last normal period was in January.  She denies any pain at this time.  No vomiting or fevers.    Nursing Notes were all reviewed and agreed with or any disagreements were addressed in the HPI.     ROS: Positives and Pertinent negatives as per HPI.    PAST MEDICAL HISTORY     Past medical history:  has a past medical history of Diabetes mellitus (HCC).    Past surgical history:  has no past surgical history on file.      PHYSICAL EXAM:  ED Triage Vitals [04/06/24 2156]   BP Temp Temp Source Pulse Respirations SpO2 Height Weight - Scale   (!) 139/90 98.7 °F (37.1 °C) Oral 76 18 100 % 1.524 m (5') 63.5 kg (139 lb 15.9 oz)        Physical Exam   Abdomen benign.   exam declined by patient.    DIAGNOSTIC RESULTS   LABS:   Labs Reviewed   HCG, QUANTITATIVE, PREGNANCY      When ordered only abnormal lab results are displayed. All other labs were within normal range or not returned as of this dictation.           EMERGENCY DEPARTMENT COURSE and DIFFERENTIAL DIAGNOSIS/MDM:     Vitals:    Vitals:    04/06/24 2156   BP: (!) 139/90   Pulse: 76   Resp: 18

## 2024-04-07 NOTE — ED NOTES
Introduce myself to the patient, identification band inplace, stretcher in the lowest position for safety, and the call light is in reach. Updated patient on Emergency Department plan of care, no additional needs at this time, will continue with care.

## 2024-04-07 NOTE — ED NOTES
Handoff report given to REHANA Posada. No further questions. Pt is currently heading to [facility] by private vehicle.

## 2024-04-30 ENCOUNTER — HOSPITAL ENCOUNTER (EMERGENCY)
Age: 29
Discharge: HOME | End: 2024-04-30
Attending: EMERGENCY MEDICINE

## 2024-04-30 VITALS
BODY MASS INDEX: 26.97 KG/M2 | TEMPERATURE: 97.9 F | RESPIRATION RATE: 20 BRPM | HEIGHT: 61 IN | OXYGEN SATURATION: 100 % | WEIGHT: 142.86 LBS | SYSTOLIC BLOOD PRESSURE: 122 MMHG | DIASTOLIC BLOOD PRESSURE: 86 MMHG | HEART RATE: 102 BPM

## 2024-04-30 DIAGNOSIS — O26.891 VAGINAL DISCHARGE DURING PREGNANCY IN FIRST TRIMESTER: Primary | ICD-10-CM

## 2024-04-30 DIAGNOSIS — N89.8 VAGINAL DISCHARGE DURING PREGNANCY IN FIRST TRIMESTER: Primary | ICD-10-CM

## 2024-04-30 LAB
BACTERIA GENITAL QL WET PREP: NORMAL
BILIRUB UR QL STRIP.AUTO: NEGATIVE
CLARITY UR: CLEAR
CLUE CELLS SPEC QL WET PREP: NORMAL
COLOR UR: YELLOW
EPI CELLS #/AREA URNS HPF: NORMAL /HPF (ref 0–5)
EPI CELLS SPEC QL WET PREP: NORMAL
GLUCOSE UR STRIP.AUTO-MCNC: >=1000 MG/DL
HCG UR QL: POSITIVE
HGB UR QL STRIP.AUTO: ABNORMAL
KETONES UR STRIP.AUTO-MCNC: 15 MG/DL
LEUKOCYTE ESTERASE UR QL STRIP.AUTO: NEGATIVE
NITRITE UR QL STRIP.AUTO: NEGATIVE
PH UR STRIP.AUTO: 6 [PH] (ref 5–8)
PROT UR STRIP.AUTO-MCNC: NEGATIVE MG/DL
RBC #/AREA URNS HPF: NORMAL /HPF (ref 0–4)
RBC SPEC QL WET PREP: NORMAL
SP GR UR STRIP.AUTO: 1.01 (ref 1–1.03)
SPECIMEN SOURCE FLD: NORMAL
T VAGINALIS GENITAL QL WET PREP: NORMAL
TRICHOMONAS #/AREA URNS HPF: NORMAL /HPF
UA COMPLETE W REFLEX CULTURE PNL UR: ABNORMAL
UA DIPSTICK W REFLEX MICRO PNL UR: YES
URN SPEC COLLECT METH UR: ABNORMAL
UROBILINOGEN UR STRIP-ACNC: 0.2 E.U./DL
WBC #/AREA URNS HPF: NORMAL /HPF (ref 0–5)
WBC SPEC QL WET PREP: NORMAL
YEAST GENITAL QL WET PREP: NORMAL

## 2024-04-30 PROCEDURE — 99283 EMERGENCY DEPT VISIT LOW MDM: CPT

## 2024-04-30 PROCEDURE — 87591 N.GONORRHOEAE DNA AMP PROB: CPT

## 2024-04-30 PROCEDURE — 84703 CHORIONIC GONADOTROPIN ASSAY: CPT

## 2024-04-30 PROCEDURE — 87210 SMEAR WET MOUNT SALINE/INK: CPT

## 2024-04-30 PROCEDURE — 87491 CHLMYD TRACH DNA AMP PROBE: CPT

## 2024-04-30 PROCEDURE — 81001 URINALYSIS AUTO W/SCOPE: CPT

## 2024-04-30 NOTE — ED TRIAGE NOTES
Patient presents to ED complaining of vaginal discharge and odor which started yesterday. Patient denies urinary symptoms. Denies back or abdominal. Patient reports currently pregnant but is unsure how far along, states last menstrual cycle was 3/28/24.    Patient resting on bed during triage, respirations even and easy at this time. No obvious distress.    Patient provided with vaginal swab kit and instructed on use.

## 2024-04-30 NOTE — ED PROVIDER NOTES
eMERGENCY dEPARTMENT eNCOUnter      Pt Name: Reny Reynolds  MRN: [id number]  Birthdate 1995  Date of evaluation: 2024  Provider: JELENA STONE MD     CHIEF COMPLAINT       Chief Complaint   Patient presents with    Vaginal Discharge     Patient presents to ED complaining of vaginal discharge and odor which started yesterday. Patient denies urinary symptoms. Denies back or abdominal. Patient reports currently pregnant but is unsure how far along.         HISTORY OF PRESENT ILLNESS   (Location/Symptom, Timing/Onset,Context/Setting, Quality, Duration, Modifying Factors, Severity) Note limiting factors.   HPI    Reny Reynolds is a 28 y.o. female who presents to the emergency department with a vaginal discharge.  Patient is a G4.  2  1 who is about 8 weeks pregnant presents with some vaginal discharge.  Patient denies any blood or bleeding.  Patient has no pain.  Patient had ultrasound in the ER couple weeks ago that was negative.  Patient scheduled see OB/GYN soon.  Patient not compliant.  Patient wants to be checked.  No nausea no vomiting.    Nursing Notes were reviewed.    REVIEW OFSYSTEMS    (2+ for level 4; 10+ for level 5)   Review of Systems    General: No fevers, chills or night sweats, No weight loss    Head:  No Sore throat,  No Ear Pain    Chest:  Nontender.  No Cough, No SOB,  Chest Pain    GI: No abdominal pain or vomiting    : No dysuria or hematuria.  Positive discharge.    Musculoskeletal: No unrelenting pain or night pain    Neurologic: No bowel or bladder incontinence, No saddle anesthesia, No leg weakness    All other systems reviewed and are negative.        PAST MEDICAL HISTORY     Past Medical History:   Diagnosis Date    Diabetes mellitus (HCC)        SURGICAL HISTORY     History reviewed. No pertinent surgical history.    CURRENT MEDICATIONS       Previous Medications    No medications on file       ALLERGIES     Shiitake mushroom    FAMILY HISTORY     History

## 2024-05-02 LAB
C TRACH DNA CVX QL NAA+PROBE: NEGATIVE
N GONORRHOEA DNA CERV MUCUS QL NAA+PROBE: NEGATIVE